# Patient Record
(demographics unavailable — no encounter records)

---

## 2025-03-08 NOTE — PHYSICAL EXAM
[Chaperone Present] : A chaperone was present in the examining room during all aspects of the physical examination [Appropriately responsive] : appropriately responsive [Alert] : alert [No Acute Distress] : no acute distress [No Lymphadenopathy] : no lymphadenopathy [Regular Rate Rhythm] : regular rate rhythm [No Murmurs] : no murmurs [Clear to Auscultation B/L] : clear to auscultation bilaterally [Soft] : soft [Non-tender] : non-tender [Non-distended] : non-distended [No HSM] : No HSM [No Mass] : no mass [No Lesions] : no lesions [Oriented x3] : oriented x3 [Examination Of The Breasts] : a normal appearance [No Masses] : no breast masses were palpable [Labia Majora] : normal [Labia Minora] : normal [Uterine Adnexae] : normal [Normal] : normal

## 2025-03-08 NOTE — HISTORY OF PRESENT ILLNESS
[postmenopausal] : postmenopausal [N] : Patient does not use contraception [Y] : Positive pregnancy history [Post-Menopause, No Sxs] : post-menopausal, currently without symptoms [No] : Patient does not have concerns regarding sex [Mammogramdate] : 7/20/24 [TextBox_19] : BR-2 [BreastSonogramDate] : 7/20/24 [TextBox_25] : BR-2 [PapSmeardate] : 3/2/24 [TextBox_31] : NEG [HPVDate] : 3/2/24 [TextBox_78] : NEG [LMPDate] : 2017 [PGHxTotal] : 4 [[facility] : 2 [[facility]Living] : 2 [PGHxABSpont] : 2 [FreeTextEntry1] : 2017